# Patient Record
Sex: FEMALE | Race: WHITE | NOT HISPANIC OR LATINO | Employment: OTHER | URBAN - METROPOLITAN AREA
[De-identification: names, ages, dates, MRNs, and addresses within clinical notes are randomized per-mention and may not be internally consistent; named-entity substitution may affect disease eponyms.]

---

## 2018-05-15 ENCOUNTER — TELEPHONE (OUTPATIENT)
Dept: ORTHOPEDICS | Facility: CLINIC | Age: 73
End: 2018-05-15

## 2018-05-15 DIAGNOSIS — M25.552 LEFT HIP PAIN: Primary | ICD-10-CM

## 2018-05-15 NOTE — TELEPHONE ENCOUNTER
Called pt to determine laterality for x-rays. Pt states Left hip. Advised pt to arrive 30 minutes early for x-ray before appointment. Pt vocalized understanding.

## 2018-05-16 ENCOUNTER — OFFICE VISIT (OUTPATIENT)
Dept: ORTHOPEDICS | Facility: CLINIC | Age: 73
End: 2018-05-16
Payer: MEDICARE

## 2018-05-16 ENCOUNTER — HOSPITAL ENCOUNTER (OUTPATIENT)
Dept: RADIOLOGY | Facility: HOSPITAL | Age: 73
Discharge: HOME | End: 2018-05-16
Attending: ORTHOPAEDIC SURGERY
Payer: MEDICARE

## 2018-05-16 VITALS — WEIGHT: 125 LBS | HEIGHT: 64 IN | BODY MASS INDEX: 21.34 KG/M2

## 2018-05-16 DIAGNOSIS — M54.50 LUMBAR PAIN: Primary | ICD-10-CM

## 2018-05-16 DIAGNOSIS — M54.50 LUMBAR PAIN: ICD-10-CM

## 2018-05-16 DIAGNOSIS — M25.552 LEFT HIP PAIN: ICD-10-CM

## 2018-05-16 DIAGNOSIS — M16.12 PRIMARY OSTEOARTHRITIS OF LEFT HIP: ICD-10-CM

## 2018-05-16 PROCEDURE — 73502 X-RAY EXAM HIP UNI 2-3 VIEWS: CPT | Mod: 26,LT,, | Performed by: RADIOLOGY

## 2018-05-16 PROCEDURE — 73502 X-RAY EXAM HIP UNI 2-3 VIEWS: CPT | Mod: TC,LT

## 2018-05-16 PROCEDURE — 72114 X-RAY EXAM L-S SPINE BENDING: CPT | Mod: 26,,, | Performed by: RADIOLOGY

## 2018-05-16 PROCEDURE — 99212 OFFICE O/P EST SF 10 MIN: CPT | Mod: PBBFAC,25 | Performed by: ORTHOPAEDIC SURGERY

## 2018-05-16 PROCEDURE — 99203 OFFICE O/P NEW LOW 30 MIN: CPT | Mod: S$PBB,,, | Performed by: ORTHOPAEDIC SURGERY

## 2018-05-16 PROCEDURE — 99999 PR PBB SHADOW E&M-EST. PATIENT-LVL II: CPT | Mod: PBBFAC,,, | Performed by: ORTHOPAEDIC SURGERY

## 2018-05-16 PROCEDURE — 72114 X-RAY EXAM L-S SPINE BENDING: CPT | Mod: TC

## 2018-05-16 RX ORDER — ALENDRONATE SODIUM 70 MG/1
70 TABLET ORAL
COMMUNITY
Start: 2018-05-15

## 2018-05-16 RX ORDER — MELOXICAM 15 MG/1
TABLET ORAL
COMMUNITY
Start: 2018-04-10 | End: 2018-11-05

## 2018-05-16 NOTE — LETTER
May 16, 2018      Aime Hickey MD  [address] [place] [address] [address]JO [address]           [facility]  [address] [place] [address] [address]  [place] [address]  Phone: [phone]  Fax: [phone]          Patient: Vibha Maria   MR Number: [id number]   YOB: 1945   Date of Visit: 5/16/2018       Dear Dr. Aime Hickey:    Thank you for referring Vibha Maria to me for evaluation. Attached you will find relevant portions of my assessment and plan of care.    If you have questions, please do not hesitate to call me. I look forward to following Vibha Maria along with you.    Sincerely,    Fam Navas MD    Enclosure  CC:  No Recipients    If you would like to receive this communication electronically, please contact [email or web address] or [phone] to request more information on [employer] access.    For providers and/or their staff who would like to refer a patient to [facility], please contact us through our one-stop-shop provider referral line, [facility], at [phone].    If you feel you have received this communication in error or would no longer like to receive these types of communications, please e-mail [email or web address]

## 2018-05-16 NOTE — PROGRESS NOTES
Subjective:     Patient ID: Vibha Maria is a 72 y.o. female.    Chief Complaint: Pain of the Left Hip    She has mostly left posterior buttock pain.  May radiates to the knee but does not go all the way down the leg.  Has groin pain with ambulation at times.  No lateral pain.  Tells me she has scoliosis as well.      Hip Pain    The pain is present in the left hip. This is a new problem. The current episode started 1 to 4 weeks ago. The quality of the pain is described as aching. The pain is at a severity of 8/10. Pertinent negatives include no fever or itching. The symptoms are aggravated by bearing weight and walking. She has tried NSAIDs for the symptoms. The treatment provided no relief.       History reviewed. No pertinent past medical history.  Past Surgical History:   Procedure Laterality Date    HYSTERECTOMY      KNEE ARTHROSCOPY Bilateral      No family history on file.  Social History     Social History    Marital status:      Spouse name: N/A    Number of children: N/A    Years of education: N/A     Occupational History    Not on file.     Social History Main Topics    Smoking status: Former Smoker    Smokeless tobacco: Never Used    Alcohol use Not on file    Drug use: Unknown    Sexual activity: Not on file     Other Topics Concern    Not on file     Social History Narrative    No narrative on file     Medication List with Changes/Refills   Current Medications    ALENDRONATE (FOSAMAX) 70 MG TABLET    Take 70 mg by mouth.    MELOXICAM (MOBIC) 15 MG TABLET        TURMERIC ROOT EXTRACT ORAL    Take by mouth.     Review of patient's allergies indicates:   Allergen Reactions    Sulfur Swelling     Swelling of eyes.     Review of Systems   Constitution: Negative for fever.   HENT: Negative for sore throat.    Eyes: Negative for blurred vision.   Cardiovascular: Negative for dyspnea on exertion.   Respiratory: Negative for shortness of breath.    Hematologic/Lymphatic: Does not  bruise/bleed easily.   Skin: Negative for itching.   Musculoskeletal: Positive for joint pain.   Gastrointestinal: Negative for vomiting.   Genitourinary: Negative for dysuria.   Neurological: Negative for dizziness.   Psychiatric/Behavioral: The patient does not have insomnia.        Objective:   Body mass index is 21.46 kg/m².  There were no vitals filed for this visit.        General    Nursing note and vitals reviewed.  Constitutional: She is oriented to person, place, and time. She appears well-developed. No distress.   HENT:   Head: Normocephalic and atraumatic.   Eyes: EOM are normal.   Cardiovascular: Normal rate.    Pulmonary/Chest: Effort normal. No stridor.   Neurological: She is alert and oriented to person, place, and time.   Psychiatric: She has a normal mood and affect. Her behavior is normal.             Right Hip Exam     Range of Motion   Flexion: 100   Internal Rotation: 15   External Rotation: 50   Abduction: 20     Tests   Pain w/ forced internal rotation (GAIL): absent  Pain w/ forced external rotation (FADIR): absent    Other   Sensation: normal  Left Hip Exam     Range of Motion   Flexion: 100   Internal Rotation: 15   External Rotation: 50   Abduction: 20     Tests   Pain w/ forced internal rotation (GAIL): absent  Pain w/ forced external rotation (FADIR): present    Other   Sensation: normal      Back (L-Spine & T-Spine) / Neck (C-Spine) Exam     Comments:  SLR negative for radicular sxs      Reflexes     Left Side  Quadriceps:  2+  Achilles:  2+  Ankle Clonus:  absent    Right Side   Quadriceps:  2+  Achilles:  2+  Ankle Clonus:  absent    Vascular Exam       Capillary Refill  Right Hand: normal capillary refill  Left Hand: normal capillary refill      IMAGING AP pelvis demonstrates mild to moderate bilateral degenerative changes of the hips but she still has superior joint space present.  Mild to moderate degenerative change to the SI joints are seen.  Lumbar films today are suboptimal  in quality and I cannot comment on interpretation.    Assessment:     Encounter Diagnoses   Name Primary?    Lumbar pain Yes    Primary osteoarthritis of left hip         Plan:     -referral to pain management for eval and tx lumbar pain, she may ultimately benefit from diagnostic / therapeutic injections around the hip and back / SI joints  -NSAIDs if can tolerate  -physical therapy

## 2018-05-17 DIAGNOSIS — M54.50 LUMBAR PAIN: Primary | ICD-10-CM

## 2018-05-17 DIAGNOSIS — M16.12 OSTEOARTHRITIS OF LEFT HIP, UNSPECIFIED OSTEOARTHRITIS TYPE: ICD-10-CM

## 2018-11-02 DIAGNOSIS — R52 PAIN: Primary | ICD-10-CM

## 2018-11-05 ENCOUNTER — HOSPITAL ENCOUNTER (OUTPATIENT)
Dept: RADIOLOGY | Facility: HOSPITAL | Age: 73
Discharge: HOME | End: 2018-11-05
Attending: ORTHOPAEDIC SURGERY
Payer: MEDICARE

## 2018-11-05 ENCOUNTER — OFFICE VISIT (OUTPATIENT)
Dept: ORTHOPEDICS | Facility: CLINIC | Age: 73
End: 2018-11-05
Payer: MEDICARE

## 2018-11-05 VITALS
BODY MASS INDEX: 21.34 KG/M2 | HEIGHT: 64 IN | DIASTOLIC BLOOD PRESSURE: 85 MMHG | WEIGHT: 125 LBS | SYSTOLIC BLOOD PRESSURE: 136 MMHG | HEART RATE: 94 BPM

## 2018-11-05 DIAGNOSIS — M17.12 ARTHRITIS OF LEFT KNEE: ICD-10-CM

## 2018-11-05 DIAGNOSIS — M17.11 ARTHRITIS OF RIGHT KNEE: ICD-10-CM

## 2018-11-05 DIAGNOSIS — M17.0 BILATERAL PRIMARY OSTEOARTHRITIS OF KNEE: Primary | ICD-10-CM

## 2018-11-05 DIAGNOSIS — R52 PAIN: ICD-10-CM

## 2018-11-05 DIAGNOSIS — M25.561 PAIN IN BOTH KNEES, UNSPECIFIED CHRONICITY: ICD-10-CM

## 2018-11-05 DIAGNOSIS — M25.562 PAIN IN BOTH KNEES, UNSPECIFIED CHRONICITY: ICD-10-CM

## 2018-11-05 DIAGNOSIS — M25.561 ARTHRALGIA OF RIGHT KNEE: ICD-10-CM

## 2018-11-05 PROCEDURE — 99214 OFFICE O/P EST MOD 30 MIN: CPT | Mod: 25,S$PBB,, | Performed by: ORTHOPAEDIC SURGERY

## 2018-11-05 PROCEDURE — 73564 X-RAY EXAM KNEE 4 OR MORE: CPT | Mod: TC,50

## 2018-11-05 PROCEDURE — 20610 DRAIN/INJ JOINT/BURSA W/O US: CPT | Mod: PBBFAC | Performed by: ORTHOPAEDIC SURGERY

## 2018-11-05 PROCEDURE — 73564 X-RAY EXAM KNEE 4 OR MORE: CPT | Mod: 26,50,, | Performed by: RADIOLOGY

## 2018-11-05 PROCEDURE — 99213 OFFICE O/P EST LOW 20 MIN: CPT | Mod: PBBFAC,25 | Performed by: ORTHOPAEDIC SURGERY

## 2018-11-05 PROCEDURE — 99999 PR PBB SHADOW E&M-EST. PATIENT-LVL III: CPT | Mod: PBBFAC,,, | Performed by: ORTHOPAEDIC SURGERY

## 2018-11-05 PROCEDURE — 20610 DRAIN/INJ JOINT/BURSA W/O US: CPT | Mod: PBBFAC,50 | Performed by: ORTHOPAEDIC SURGERY

## 2018-11-05 PROCEDURE — 97110 THERAPEUTIC EXERCISES: CPT | Mod: GP,,, | Performed by: ORTHOPAEDIC SURGERY

## 2018-11-05 RX ORDER — METHYLPREDNISOLONE ACETATE 80 MG/ML
80 INJECTION, SUSPENSION INTRA-ARTICULAR; INTRALESIONAL; INTRAMUSCULAR; SOFT TISSUE
Status: DISCONTINUED | OUTPATIENT
Start: 2018-11-05 | End: 2018-11-05 | Stop reason: HOSPADM

## 2018-11-05 RX ORDER — CELECOXIB 100 MG/1
100 CAPSULE ORAL DAILY PRN
Refills: 1 | COMMUNITY
Start: 2018-10-31

## 2018-11-05 RX ADMIN — METHYLPREDNISOLONE ACETATE 80 MG: 80 INJECTION, SUSPENSION INTRALESIONAL; INTRAMUSCULAR; INTRASYNOVIAL; SOFT TISSUE at 07:11

## 2018-11-05 NOTE — PROGRESS NOTES
Subjective:     Patient ID: Vibha Maria is a 72 y.o. female.    Chief Complaint: Pain of the Left Knee and Pain of the Right Knee    She is here for bilateral knee pain, she has a bilateral knee arthroscopic surgeries in the past.  Pain is affecting her daily activities.  She is very active overall, walks 2 miles several days per week.      Knee Pain    The pain is present in the right knee and left knee. This is a recurrent problem. The current episode started more than 1 year ago (pain for 10 years). There has been no history of extremity trauma. Movement associated with injury: no injury.The problem occurs intermittently. The problem has been gradually worsening. The quality of the pain is described as aching and tight. The pain is at a severity of 5/10. Associated symptoms include stiffness. Pertinent negatives include no fever or itching. Exacerbated by: no specific aggravating factors. She has tried nothing for the symptoms. Physical therapy was not tried.      History reviewed. No pertinent past medical history.  Past Surgical History:   Procedure Laterality Date    HYSTERECTOMY      KNEE ARTHROSCOPY Bilateral      History reviewed. No pertinent family history.  Social History     Socioeconomic History    Marital status:      Spouse name: Not on file    Number of children: Not on file    Years of education: Not on file    Highest education level: Not on file   Social Needs    Financial resource strain: Not on file    Food insecurity - worry: Not on file    Food insecurity - inability: Not on file    Transportation needs - medical: Not on file    Transportation needs - non-medical: Not on file   Occupational History    Not on file   Tobacco Use    Smoking status: Former Smoker    Smokeless tobacco: Never Used   Substance and Sexual Activity    Alcohol use: No     Frequency: Never    Drug use: No    Sexual activity: Not on file   Other Topics Concern    Not on file   Social History  Narrative    Not on file        Medication List           Accurate as of 11/5/18  7:14 PM. If you have any questions, ask your nurse or doctor.               CONTINUE taking these medications    alendronate 70 MG tablet  Commonly known as:  FOSAMAX     celecoxib 100 MG capsule  Commonly known as:  CeleBREX     RHEUMATE ORAL        STOP taking these medications    meloxicam 15 MG tablet  Commonly known as:  MOBIC  Stopped by:  Fam Navas MD     TURMERIC ROOT EXTRACT ORAL  Stopped by:  Fam Navas MD          Review of patient's allergies indicates:   Allergen Reactions    Sulfur Swelling     Swelling of eyes.     Review of Systems   Constitution: Negative for fever.   HENT: Negative for sore throat.    Eyes: Negative for blurred vision.   Cardiovascular: Negative for dyspnea on exertion.   Respiratory: Negative for shortness of breath.    Hematologic/Lymphatic: Bruises/bleeds easily.   Skin: Negative for itching.   Musculoskeletal: Positive for arthritis, joint pain, neck pain and stiffness.   Gastrointestinal: Negative for vomiting.   Genitourinary: Negative for dysuria.   Neurological: Negative for dizziness.   Psychiatric/Behavioral: The patient does not have insomnia.        Objective:   Body mass index is 21.46 kg/m².  Vitals:    11/05/18 1107   BP: 136/85   Pulse: 94           General    Nursing note and vitals reviewed.  Constitutional: She is oriented to person, place, and time. She appears well-developed. No distress.   HENT:   Head: Normocephalic and atraumatic.   Eyes: EOM are normal.   Cardiovascular: Normal rate.    Pulmonary/Chest: Effort normal. No stridor.   Neurological: She is alert and oriented to person, place, and time.   Psychiatric: She has a normal mood and affect. Her behavior is normal.     General Musculoskeletal Exam   Gait: antalgic       Right Knee Exam     Inspection   Effusion: present    Tenderness   The patient is tender to palpation of the lateral joint  line.    Crepitus   The patient has crepitus of the patella.    Range of Motion   Extension: 0   Flexion: 120     Tests   Ligament Examination PCL-Posterior Drawer: normal (0 to 2mm)     MCL - Valgus: abnormal - grade I  LCL - Varus: normal  Patella   Patellar Tracking: normal  J-Sign: none    Other   Sensation: normal    Left Knee Exam     Inspection   Effusion: present    Tenderness   The patient tender to palpation of the lateral joint line.    Crepitus   The patient has crepitus of the patella.    Range of Motion   Extension: 0   Flexion: 120     Tests   Stability PCL-Posterior Drawer: normal (0 to 2mm)  MCL - Valgus: abnormal - grade I  LCL - Varus: normal (0 to 2mm)  Patella   Patellar Tracking: normal  J-Sign: J sign absent    Other   Sensation: normal    Muscle Strength   Right Lower Extremity   Right quadriceps strength: mild decreased quad tone.   Left Lower Extremity   Left quadriceps strength: mild decreased quad tone.       IMAGING   EXAMINATION:  XR KNEE ORTHO BILAT WITH FLEXION    CLINICAL HISTORY:  Pain in right knee    TECHNIQUE:  AP standing of both knees, PA flexion standing views of both knees, and Merchant views of both knees were performed.  Lateral views of both knees were also performed.    COMPARISON:  None    FINDINGS:  Essentially symmetric bilateral tricompartmental degenerative changes noted, advanced within the lateral compartments consisting of complete joint space loss and marginal osteophyte formation.  There is bilateral lateral tibial translation.  No acute osseous abnormality.  No large joint effusion.      Impression       As above      Electronically signed by: Jere Serrano MD  Date: 11/05/2018  Time: 11:09     Radiographs / Imaging : Results reviewed by me and interpreted by me, discussed with the patient and / or family       Assessment:     Encounter Diagnoses   Name Primary?    Pain in both knees, unspecified chronicity Yes    Arthritis of left knee     Arthralgia of  right knee     Arthritis of right knee         Plan:     - PT for quad strengthening/Home exercise program    HEP 44289 - I, instructed and demonstrated a bilateral knee strengthening HEP. The patient then demonstrated understanding of exercises and proper technique. This program was performed for 15 minutes.       - lateral  Brace bilateral     - Steroid injection today bilateral knees      Discussed surgical indications in the future if no relief

## 2018-11-06 NOTE — PROCEDURES
Large Joint Aspiration/Injection: R knee  Date/Time: 11/5/2018 7:19 PM  Performed by: Fam Navas MD  Authorized by: Fam Navas MD     Consent Done?:  Yes (Verbal)  Indications:  Pain and diagnostic evaluation  Procedure site marked: Yes    Timeout: Prior to procedure the correct patient, procedure, and site was verified      Location:  Knee  Site:  R knee  Prep: Patient was prepped and draped in usual sterile fashion    Needle size:  22 G  Approach:  Anterolateral  Medications:  80 mg methylPREDNISolone acetate 80 mg/mL  Patient tolerance:  Patient tolerated the procedure well with no immediate complications    Additional Comments: The patient had no adverse reactions to the medication. The patient was instructed to apply ice to the joint for 30 minutes on and 30 minutes off for the next 48 hours, and avoid strenuous activities for 48 hours following the injection. Patient was warned of possible blood sugar and/or blood pressure changes during that time regarding corticosteroid injections if applicable.  Following that time, patient can resume regular activities. Note that informed consent was performed with the patient before injection discussing risks, benefits, indications and alternatives to injection, risks including but not limited to bleeding and infection.

## 2018-11-06 NOTE — PROCEDURES
Large Joint Aspiration/Injection: L knee  Date/Time: 11/5/2018 7:20 PM  Performed by: Fam Navas MD  Authorized by: Fam Navas MD     Consent Done?:  Yes (Verbal)  Indications:  Pain and diagnostic evaluation  Procedure site marked: Yes    Timeout: Prior to procedure the correct patient, procedure, and site was verified      Location:  Knee  Site:  L knee  Prep: Patient was prepped and draped in usual sterile fashion    Ultrasonic Guidance for needle placement: No  Needle size:  22 G  Approach:  Anterolateral  Medications:  80 mg methylPREDNISolone acetate 80 mg/mL  Patient tolerance:  Patient tolerated the procedure well with no immediate complications    Additional Comments: The patient had no adverse reactions to the medication. The patient was instructed to apply ice to the joint for 30 minutes on and 30 minutes off for the next 48 hours, and avoid strenuous activities for 48 hours following the injection. Patient was warned of possible blood sugar and/or blood pressure changes during that time regarding corticosteroid injections if applicable.  Following that time, patient can resume regular activities. Note that informed consent was performed with the patient before injection discussing risks, benefits, indications and alternatives to injection, risks including but not limited to bleeding and infection.

## 2024-09-02 ENCOUNTER — OFFICE VISIT (OUTPATIENT)
Dept: URGENT CARE | Facility: CLINIC | Age: 79
End: 2024-09-02
Payer: MEDICARE

## 2024-09-02 VITALS
SYSTOLIC BLOOD PRESSURE: 146 MMHG | OXYGEN SATURATION: 97 % | HEART RATE: 96 BPM | RESPIRATION RATE: 20 BRPM | TEMPERATURE: 98 F | BODY MASS INDEX: 22.51 KG/M2 | DIASTOLIC BLOOD PRESSURE: 74 MMHG | WEIGHT: 135.13 LBS | HEIGHT: 65 IN

## 2024-09-02 DIAGNOSIS — R03.0 ELEVATED HEART RATE WITH ELEVATED BLOOD PRESSURE WITHOUT DIAGNOSIS OF HYPERTENSION: Primary | ICD-10-CM

## 2024-09-02 DIAGNOSIS — R00.9 ELEVATED HEART RATE WITH ELEVATED BLOOD PRESSURE WITHOUT DIAGNOSIS OF HYPERTENSION: Primary | ICD-10-CM

## 2024-09-02 PROCEDURE — 93005 ELECTROCARDIOGRAM TRACING: CPT | Mod: S$GLB,,, | Performed by: NURSE PRACTITIONER

## 2024-09-02 PROCEDURE — 99203 OFFICE O/P NEW LOW 30 MIN: CPT | Mod: S$GLB,,, | Performed by: NURSE PRACTITIONER

## 2024-09-02 PROCEDURE — 93010 ELECTROCARDIOGRAM REPORT: CPT | Mod: S$GLB,,, | Performed by: INTERNAL MEDICINE

## 2024-09-02 RX ORDER — METOPROLOL TARTRATE 50 MG/1
50 TABLET ORAL 2 TIMES DAILY
COMMUNITY
Start: 2024-08-06

## 2024-09-02 RX ORDER — TIOTROPIUM BROMIDE INHALATION SPRAY 3.12 UG/1
2 SPRAY, METERED RESPIRATORY (INHALATION) DAILY
COMMUNITY
Start: 2024-07-12

## 2024-09-02 NOTE — PROGRESS NOTES
"Subjective:      Patient ID: Vibha Maira is a 78 y.o. female.    Vitals:  height is 5' 5" (1.651 m) and weight is 61.3 kg (135 lb 2.3 oz). Her tympanic temperature is 98 °F (36.7 °C). Her blood pressure is 146/74 (abnormal) and her pulse is 96. Her respiration is 20 and oxygen saturation is 97%.     Chief Complaint: Irregular Heart Beat    78 year old female presents for evaluation of elevated heart rate on apple watch (120's-150's). Reports symptom onset during aerobics this morning, heart rate came down (95) once she discontinued aerobics. Reports aerobics 3 times weekly without symptoms. Reports history of SVT, managed with metoprolol 50 mg BID (missed PM dose last night). Annual Cardiology consults, last visit 1 month ago. Reports sufficient hydration. Reports blood pressure elevations without diagnosis of hypertension.      Constitution: Negative.   HENT: Negative.     Neck: neck negative.   Cardiovascular:  Positive for palpitations. Negative for chest trauma, chest pain, leg swelling, sob on exertion and passing out.   Eyes: Negative.    Respiratory: Negative.  Negative for chest tightness and shortness of breath.    Gastrointestinal: Negative.    Endocrine: negative.   Genitourinary: Negative.    Musculoskeletal: Negative.    Skin: Negative.    Allergic/Immunologic: Negative.    Neurological: Negative.    Hematologic/Lymphatic: Negative.    Psychiatric/Behavioral: Negative.        Objective:     Physical Exam   Constitutional: She is oriented to person, place, and time. She is cooperative.  Non-toxic appearance. She does not appear ill. No distress. She is not intubated. normalawake  HENT:   Head: Normocephalic and atraumatic.   Ears:   Right Ear: Hearing normal. Tympanic membrane is not injected, not scarred, not perforated, not erythematous, not retracted and not bulging. no impacted cerumen  Left Ear: Hearing normal. Tympanic membrane is not injected, not scarred, not perforated, not erythematous, not " retracted and not bulging. no impacted cerumen  Eyes: Conjunctivae are normal. Pupils are equal, round, and reactive to light. Right eye exhibits no discharge. Left eye exhibits no discharge. No scleral icterus. Extraocular movement intact   Neck: Neck supple.   Cardiovascular: Normal heart sounds. Tachycardia present.   Pulmonary/Chest: Effort normal and breath sounds normal. No accessory muscle usage or stridor. No apnea, no tachypnea and no bradypnea. She is not intubated. No respiratory distress. She has no decreased breath sounds. She has no wheezes. She has no rhonchi. She has no rales. She exhibits no tenderness.   Abdominal: Normal appearance.   Musculoskeletal: Normal range of motion.         General: Normal range of motion.      Right lower leg: No edema.      Left lower leg: No edema.   Neurological: no focal deficit. She is alert and oriented to person, place, and time.   Skin: Skin is warm, dry and not diaphoretic.   Psychiatric: Mood, judgment and thought content normal.   Nursing note and vitals reviewed.    EKG: normal EKG, normal sinus rhythm.   Assessment:     1. Elevated heart rate with elevated blood pressure without diagnosis of hypertension        Plan:       Elevated heart rate with elevated blood pressure without diagnosis of hypertension  -     IN OFFICE EKG 12-LEAD (to Muse)        Patient presents for evaluation of elevated heart rate. Normal EKG. Plan is to manage symptoms, prevent worsening, and follow up with Cardiology. Discussed with patient who verbalizes understanding.      Patient Instructions   Rest  Maintain Hydration/increase fluids  Medication compliance: continue Metoprolol twice daily as directed  Monitor symptoms follow up with Cardiology  Follow up sooner as needed/with worsening

## 2024-09-02 NOTE — PATIENT INSTRUCTIONS
Rest  Maintain Hydration/increase fluids  Medication compliance: continue Metoprolol twice daily as directed  Monitor symptoms follow up with Cardiology  Follow up sooner as needed/with worsening

## 2024-09-03 LAB
OHS QRS DURATION: 84 MS
OHS QTC CALCULATION: 438 MS